# Patient Record
Sex: FEMALE | Race: WHITE | Employment: PART TIME | URBAN - METROPOLITAN AREA
[De-identification: names, ages, dates, MRNs, and addresses within clinical notes are randomized per-mention and may not be internally consistent; named-entity substitution may affect disease eponyms.]

---

## 2017-03-02 ENCOUNTER — LAB ENCOUNTER (OUTPATIENT)
Dept: LAB | Facility: HOSPITAL | Age: 57
End: 2017-03-02
Attending: INTERNAL MEDICINE
Payer: MEDICAID

## 2017-03-02 DIAGNOSIS — M06.09 RHEUMATOID ARTHRITIS OF MULTIPLE SITES WITH NEGATIVE RHEUMATOID FACTOR (HCC): ICD-10-CM

## 2017-03-02 DIAGNOSIS — M85.80 OSTEOPENIA: ICD-10-CM

## 2017-03-02 DIAGNOSIS — IMO0001 VERTEBRAL COMPRESSION FRACTURE, INITIAL ENCOUNTER: ICD-10-CM

## 2017-03-02 LAB
ALBUMIN SERPL BCP-MCNC: 4.1 G/DL (ref 3.5–4.8)
ALP SERPL-CCNC: 50 U/L (ref 32–100)
ALT SERPL-CCNC: 15 U/L (ref 14–54)
AST SERPL-CCNC: 28 U/L (ref 15–41)
BASOPHILS # BLD: 0.1 K/UL (ref 0–0.2)
BASOPHILS NFR BLD: 1 %
BILIRUB DIRECT SERPL-MCNC: 0.1 MG/DL (ref 0–0.2)
BILIRUB SERPL-MCNC: 0.6 MG/DL (ref 0.3–1.2)
BUN SERPL-MCNC: 15 MG/DL (ref 8–20)
CREAT SERPL-MCNC: 0.7 MG/DL (ref 0.5–1.5)
CRP SERPL-MCNC: 3.1 MG/DL (ref 0–0.9)
EOSINOPHIL # BLD: 0 K/UL (ref 0–0.7)
EOSINOPHIL NFR BLD: 0 %
ERYTHROCYTE [DISTWIDTH] IN BLOOD BY AUTOMATED COUNT: 14.7 % (ref 11–15)
ERYTHROCYTE [SEDIMENTATION RATE] IN BLOOD: 39 MM/HR (ref 0–30)
HCT VFR BLD AUTO: 38.7 % (ref 35–48)
HGB BLD-MCNC: 12.8 G/DL (ref 12–16)
LYMPHOCYTES # BLD: 0.9 K/UL (ref 1–4)
LYMPHOCYTES NFR BLD: 8 %
MCH RBC QN AUTO: 31.4 PG (ref 27–32)
MCHC RBC AUTO-ENTMCNC: 33 G/DL (ref 32–37)
MCV RBC AUTO: 95 FL (ref 80–100)
MONOCYTES # BLD: 0.5 K/UL (ref 0–1)
MONOCYTES NFR BLD: 4 %
NEUTROPHILS # BLD AUTO: 10.4 K/UL (ref 1.8–7.7)
NEUTROPHILS NFR BLD: 87 %
PLATELET # BLD AUTO: 382 K/UL (ref 140–400)
PMV BLD AUTO: 9.1 FL (ref 7.4–10.3)
PROT SERPL-MCNC: 7.7 G/DL (ref 5.9–8.4)
RBC # BLD AUTO: 4.08 M/UL (ref 3.7–5.4)
WBC # BLD AUTO: 11.9 K/UL (ref 4–11)

## 2017-03-02 PROCEDURE — 84520 ASSAY OF UREA NITROGEN: CPT

## 2017-03-02 PROCEDURE — 86140 C-REACTIVE PROTEIN: CPT

## 2017-03-02 PROCEDURE — 80076 HEPATIC FUNCTION PANEL: CPT

## 2017-03-02 PROCEDURE — 85652 RBC SED RATE AUTOMATED: CPT

## 2017-03-02 PROCEDURE — 82565 ASSAY OF CREATININE: CPT

## 2017-03-02 PROCEDURE — 85025 COMPLETE CBC W/AUTO DIFF WBC: CPT

## 2017-03-02 PROCEDURE — 36415 COLL VENOUS BLD VENIPUNCTURE: CPT

## 2017-05-19 PROCEDURE — 82330 ASSAY OF CALCIUM: CPT | Performed by: INTERNAL MEDICINE

## 2017-05-19 PROCEDURE — 86200 CCP ANTIBODY: CPT | Performed by: INTERNAL MEDICINE

## 2017-05-19 PROCEDURE — 87186 SC STD MICRODIL/AGAR DIL: CPT | Performed by: INTERNAL MEDICINE

## 2017-05-19 PROCEDURE — 86225 DNA ANTIBODY NATIVE: CPT | Performed by: INTERNAL MEDICINE

## 2017-05-19 PROCEDURE — 81001 URINALYSIS AUTO W/SCOPE: CPT | Performed by: INTERNAL MEDICINE

## 2017-05-19 PROCEDURE — 87077 CULTURE AEROBIC IDENTIFY: CPT | Performed by: INTERNAL MEDICINE

## 2017-05-19 PROCEDURE — 86160 COMPLEMENT ANTIGEN: CPT | Performed by: INTERNAL MEDICINE

## 2017-05-19 PROCEDURE — 87086 URINE CULTURE/COLONY COUNT: CPT | Performed by: INTERNAL MEDICINE

## 2020-01-15 ENCOUNTER — HOSPITAL (OUTPATIENT)
Dept: OTHER | Age: 60
End: 2020-01-15
Attending: FAMILY MEDICINE

## 2020-02-11 ENCOUNTER — HOSPITAL ENCOUNTER (OUTPATIENT)
Dept: PHYSICAL MEDICINE AND REHAB | Age: 60
Discharge: STILL ADMITTED OR ADMINISTRATIVE | End: 2020-02-11
Attending: FAMILY MEDICINE

## 2020-02-11 DIAGNOSIS — M25.562 ARTHRALGIA OF LEFT LOWER LEG: ICD-10-CM

## 2020-02-11 PROCEDURE — 97113 AQUATIC THERAPY/EXERCISES: CPT | Performed by: PHYSICAL THERAPY ASSISTANT

## 2020-02-12 ASSESSMENT — ENCOUNTER SYMPTOMS: PAIN SEVERITY NOW: 6

## 2020-02-13 ENCOUNTER — HOSPITAL ENCOUNTER (OUTPATIENT)
Dept: PHYSICAL MEDICINE AND REHAB | Age: 60
Discharge: STILL ADMITTED OR ADMINISTRATIVE | End: 2020-02-13
Attending: FAMILY MEDICINE

## 2020-02-13 ENCOUNTER — APPOINTMENT (OUTPATIENT)
Dept: PHYSICAL MEDICINE AND REHAB | Age: 60
End: 2020-02-13

## 2020-02-13 DIAGNOSIS — M54.89 OTHER DORSALGIA: ICD-10-CM

## 2020-02-13 DIAGNOSIS — M06.9 RHEUMATOID ARTHRITIS, ADULT (CMD): Primary | ICD-10-CM

## 2020-02-13 PROCEDURE — 97113 AQUATIC THERAPY/EXERCISES: CPT | Performed by: PHYSICAL THERAPY ASSISTANT

## 2020-02-18 ENCOUNTER — APPOINTMENT (OUTPATIENT)
Dept: PHYSICAL MEDICINE AND REHAB | Age: 60
End: 2020-02-18
Attending: FAMILY MEDICINE

## 2020-02-18 ENCOUNTER — HOSPITAL ENCOUNTER (OUTPATIENT)
Dept: PHYSICAL MEDICINE AND REHAB | Age: 60
Discharge: STILL ADMITTED OR ADMINISTRATIVE | End: 2020-02-19
Attending: FAMILY MEDICINE

## 2020-02-18 DIAGNOSIS — M25.552 PAIN IN LEFT HIP: Primary | ICD-10-CM

## 2020-02-18 PROCEDURE — 97113 AQUATIC THERAPY/EXERCISES: CPT | Performed by: PHYSICAL THERAPY ASSISTANT

## 2020-02-19 ASSESSMENT — ENCOUNTER SYMPTOMS: PAIN SEVERITY NOW: 0

## 2020-02-20 ENCOUNTER — HOSPITAL ENCOUNTER (OUTPATIENT)
Dept: PHYSICAL MEDICINE AND REHAB | Age: 60
Discharge: STILL ADMITTED OR ADMINISTRATIVE | End: 2020-02-20
Attending: FAMILY MEDICINE

## 2020-02-20 ENCOUNTER — APPOINTMENT (OUTPATIENT)
Dept: PHYSICAL MEDICINE AND REHAB | Age: 60
End: 2020-02-20
Attending: FAMILY MEDICINE

## 2020-02-20 DIAGNOSIS — M25.552 LEFT HIP PAIN: Primary | ICD-10-CM

## 2020-02-20 PROCEDURE — 97113 AQUATIC THERAPY/EXERCISES: CPT | Performed by: PHYSICAL THERAPIST

## 2022-04-20 RX ORDER — LEVOTHYROXINE SODIUM 0.1 MG/1
100 TABLET ORAL
COMMUNITY
Start: 2017-08-16

## 2022-04-21 ENCOUNTER — HOSPITAL ENCOUNTER (OUTPATIENT)
Age: 62
Discharge: HOME | End: 2022-04-21
Attending: DENTIST | Admitting: DENTIST

## 2022-04-21 ENCOUNTER — ANESTHESIA (OUTPATIENT)
Dept: SURGERY | Age: 62
End: 2022-04-21

## 2022-04-21 ENCOUNTER — ANESTHESIA EVENT (OUTPATIENT)
Dept: SURGERY | Age: 62
End: 2022-04-21

## 2022-04-21 VITALS
HEART RATE: 92 BPM | HEIGHT: 60 IN | DIASTOLIC BLOOD PRESSURE: 88 MMHG | OXYGEN SATURATION: 93 % | RESPIRATION RATE: 18 BRPM | TEMPERATURE: 96.8 F | WEIGHT: 115 LBS | SYSTOLIC BLOOD PRESSURE: 131 MMHG | BODY MASS INDEX: 22.58 KG/M2

## 2022-04-21 LAB
ANION GAP SERPL CALC-SCNC: 8 MMOL/L (ref 10–20)
ATRIAL RATE (BPM): 87
BASOPHILS # BLD: 0.1 K/MCL (ref 0–0.3)
BASOPHILS NFR BLD: 1 %
BUN SERPL-MCNC: 14 MG/DL (ref 6–20)
BUN/CREAT SERPL: 22 (ref 7–25)
CALCIUM SERPL-MCNC: 9.8 MG/DL (ref 8.4–10.2)
CHLORIDE SERPL-SCNC: 105 MMOL/L (ref 98–107)
CO2 SERPL-SCNC: 29 MMOL/L (ref 21–32)
CREAT SERPL-MCNC: 0.63 MG/DL (ref 0.51–0.95)
DEPRECATED RDW RBC: 59.4 FL (ref 39–50)
EOSINOPHIL # BLD: 0.1 K/MCL (ref 0–0.5)
EOSINOPHIL NFR BLD: 2 %
ERYTHROCYTE [DISTWIDTH] IN BLOOD: 16.9 % (ref 11–15)
FASTING DURATION TIME PATIENT: ABNORMAL H
GFR SERPLBLD BASED ON 1.73 SQ M-ARVRAT: >90 ML/MIN
GLUCOSE SERPL-MCNC: 81 MG/DL (ref 70–99)
HCT VFR BLD CALC: 31.2 % (ref 36–46.5)
HGB BLD-MCNC: 10 G/DL (ref 12–15.5)
IMM GRANULOCYTES # BLD AUTO: 0.1 K/MCL (ref 0–0.2)
IMM GRANULOCYTES # BLD: 1 %
LYMPHOCYTES # BLD: 1.5 K/MCL (ref 1–4)
LYMPHOCYTES NFR BLD: 18 %
MCH RBC QN AUTO: 31.2 PG (ref 26–34)
MCHC RBC AUTO-ENTMCNC: 32.1 G/DL (ref 32–36.5)
MCV RBC AUTO: 97.2 FL (ref 78–100)
MONOCYTES # BLD: 0.8 K/MCL (ref 0.3–0.9)
MONOCYTES NFR BLD: 10 %
NEUTROPHILS # BLD: 5.7 K/MCL (ref 1.8–7.7)
NEUTROPHILS NFR BLD: 68 %
NRBC BLD MANUAL-RTO: 0 /100 WBC
P AXIS (DEGREES): 46
PLATELET # BLD AUTO: 381 K/MCL (ref 140–450)
POTASSIUM SERPL-SCNC: 3.8 MMOL/L (ref 3.4–5.1)
PR-INTERVAL (MSEC): 138
QRS-INTERVAL (MSEC): 72
QT-INTERVAL (MSEC): 346
QTC: 416
R AXIS (DEGREES): -13
RBC # BLD: 3.21 MIL/MCL (ref 4–5.2)
REPORT TEXT: NORMAL
SARS-COV-2 RNA RESP QL NAA+PROBE: NOT DETECTED
SERVICE CMNT-IMP: NORMAL
SERVICE CMNT-IMP: NORMAL
SODIUM SERPL-SCNC: 138 MMOL/L (ref 135–145)
T AXIS (DEGREES): 44
VENTRICULAR RATE EKG/MIN (BPM): 87
WBC # BLD: 8.3 K/MCL (ref 4.2–11)

## 2022-04-21 PROCEDURE — 10004452 HB PACU ADDL 30 MINUTES: Performed by: DENTIST

## 2022-04-21 PROCEDURE — C1713 ANCHOR/SCREW BN/BN,TIS/BN: HCPCS | Performed by: DENTIST

## 2022-04-21 PROCEDURE — 13000001 HB PHASE II RECOVERY EA 30 MINUTES: Performed by: DENTIST

## 2022-04-21 PROCEDURE — 10002801 HB RX 250 W/O HCPCS

## 2022-04-21 PROCEDURE — 13000037 HB COMPLEX CASE EACH ADD MINUTE: Performed by: DENTIST

## 2022-04-21 PROCEDURE — 93005 ELECTROCARDIOGRAM TRACING: CPT | Performed by: DENTIST

## 2022-04-21 PROCEDURE — 13000036 HB COMPLEX  CASE S/U + 1ST 15 MIN: Performed by: DENTIST

## 2022-04-21 PROCEDURE — 80048 BASIC METABOLIC PNL TOTAL CA: CPT | Performed by: DENTIST

## 2022-04-21 PROCEDURE — 93010 ELECTROCARDIOGRAM REPORT: CPT | Performed by: INTERNAL MEDICINE

## 2022-04-21 PROCEDURE — 13000003 HB ANESTHESIA  GENERAL EA ADD MINUTE: Performed by: DENTIST

## 2022-04-21 PROCEDURE — 85025 COMPLETE CBC W/AUTO DIFF WBC: CPT | Performed by: DENTIST

## 2022-04-21 PROCEDURE — 10002800 HB RX 250 W HCPCS

## 2022-04-21 PROCEDURE — 10006027 HB SUPPLY 278: Performed by: DENTIST

## 2022-04-21 PROCEDURE — U0005 INFEC AGEN DETEC AMPLI PROBE: HCPCS | Performed by: DENTIST

## 2022-04-21 PROCEDURE — 10002807 HB RX 258

## 2022-04-21 PROCEDURE — 10002803 HB RX 637

## 2022-04-21 PROCEDURE — 10004451 HB PACU RECOVERY 1ST 30 MINUTES: Performed by: DENTIST

## 2022-04-21 PROCEDURE — 10006023 HB SUPPLY 272: Performed by: DENTIST

## 2022-04-21 PROCEDURE — 13000002 HB ANESTHESIA  GENERAL  S/U + 1ST 15 MIN: Performed by: DENTIST

## 2022-04-21 PROCEDURE — 10002801 HB RX 250 W/O HCPCS: Performed by: DENTIST

## 2022-04-21 DEVICE — IMPLANTABLE DEVICE: Type: IMPLANTABLE DEVICE | Site: FACE | Status: FUNCTIONAL

## 2022-04-21 DEVICE — SCREW BN 2MM 6MM SMARTLOCK SELF DRILL LOCK TI NS MXLFCL LF: Type: IMPLANTABLE DEVICE | Site: FACE | Status: FUNCTIONAL

## 2022-04-21 DEVICE — SCREW BN 2MM 8MM SMARTLOCK SELF DRILL LOCK TI NS MXLFCL LF: Type: IMPLANTABLE DEVICE | Site: FACE | Status: FUNCTIONAL

## 2022-04-21 DEVICE — WIRE DNTL 160MM 24GA LGT BLUNT: Type: IMPLANTABLE DEVICE | Site: MOUTH | Status: FUNCTIONAL

## 2022-04-21 DEVICE — PLATE BN MXLMNDB NS SMARTLOCK HYBRID MMF: Type: IMPLANTABLE DEVICE | Site: FACE | Status: FUNCTIONAL

## 2022-04-21 DEVICE — SCREW BN 2MM 8MM SELF DRILL XPN LGT WIRE TI NS SLVR MXLMNDB: Type: IMPLANTABLE DEVICE | Site: FACE | Status: FUNCTIONAL

## 2022-04-21 DEVICE — SCREW BN 2MM 12MM SELF DRILL XPN LGT WIRE TI NS SLVR MXLMNDB: Type: IMPLANTABLE DEVICE | Site: FACE | Status: FUNCTIONAL

## 2022-04-21 RX ORDER — ONDANSETRON 2 MG/ML
INJECTION INTRAMUSCULAR; INTRAVENOUS PRN
Status: DISCONTINUED | OUTPATIENT
Start: 2022-04-21 | End: 2022-04-21

## 2022-04-21 RX ORDER — ROCURONIUM BROMIDE 10 MG/ML
INJECTION, SOLUTION INTRAVENOUS PRN
Status: DISCONTINUED | OUTPATIENT
Start: 2022-04-21 | End: 2022-04-21

## 2022-04-21 RX ORDER — DICLOFENAC SODIUM AND MISOPROSTOL 75; 200 MG/1; UG/1
TABLET, DELAYED RELEASE ORAL
COMMUNITY

## 2022-04-21 RX ORDER — PREDNISONE 5 MG/1
5 TABLET ORAL DAILY
COMMUNITY

## 2022-04-21 RX ORDER — LIDOCAINE HYDROCHLORIDE 20 MG/ML
INJECTION, SOLUTION INFILTRATION; PERINEURAL PRN
Status: DISCONTINUED | OUTPATIENT
Start: 2022-04-21 | End: 2022-04-21

## 2022-04-21 RX ORDER — HYDRALAZINE HYDROCHLORIDE 20 MG/ML
5 INJECTION INTRAMUSCULAR; INTRAVENOUS EVERY 10 MIN PRN
Status: DISCONTINUED | OUTPATIENT
Start: 2022-04-21 | End: 2022-04-22 | Stop reason: HOSPADM

## 2022-04-21 RX ORDER — IBUPROFEN 400 MG/1
400 TABLET ORAL EVERY 6 HOURS PRN
COMMUNITY

## 2022-04-21 RX ORDER — GLYCOPYRROLATE 0.2 MG/ML
INJECTION, SOLUTION INTRAMUSCULAR; INTRAVENOUS PRN
Status: DISCONTINUED | OUTPATIENT
Start: 2022-04-21 | End: 2022-04-21

## 2022-04-21 RX ORDER — METOCLOPRAMIDE HYDROCHLORIDE 5 MG/ML
5 INJECTION INTRAMUSCULAR; INTRAVENOUS EVERY 6 HOURS PRN
Status: DISCONTINUED | OUTPATIENT
Start: 2022-04-21 | End: 2022-04-22 | Stop reason: HOSPADM

## 2022-04-21 RX ORDER — ONDANSETRON 2 MG/ML
4 INJECTION INTRAMUSCULAR; INTRAVENOUS 2 TIMES DAILY PRN
Status: DISCONTINUED | OUTPATIENT
Start: 2022-04-21 | End: 2022-04-22 | Stop reason: HOSPADM

## 2022-04-21 RX ORDER — MIDAZOLAM HYDROCHLORIDE 1 MG/ML
INJECTION, SOLUTION INTRAMUSCULAR; INTRAVENOUS PRN
Status: DISCONTINUED | OUTPATIENT
Start: 2022-04-21 | End: 2022-04-21

## 2022-04-21 RX ORDER — DEXAMETHASONE SODIUM PHOSPHATE 4 MG/ML
INJECTION, SOLUTION INTRA-ARTICULAR; INTRALESIONAL; INTRAMUSCULAR; INTRAVENOUS; SOFT TISSUE PRN
Status: DISCONTINUED | OUTPATIENT
Start: 2022-04-21 | End: 2022-04-21

## 2022-04-21 RX ORDER — SODIUM CHLORIDE, SODIUM LACTATE, POTASSIUM CHLORIDE, CALCIUM CHLORIDE 600; 310; 30; 20 MG/100ML; MG/100ML; MG/100ML; MG/100ML
INJECTION, SOLUTION INTRAVENOUS CONTINUOUS PRN
Status: DISCONTINUED | OUTPATIENT
Start: 2022-04-21 | End: 2022-04-21

## 2022-04-21 RX ORDER — CLINDAMYCIN PHOSPHATE 900 MG/50ML
900 INJECTION INTRAVENOUS
Status: DISCONTINUED | OUTPATIENT
Start: 2022-04-21 | End: 2022-04-21 | Stop reason: HOSPADM

## 2022-04-21 RX ORDER — SODIUM CHLORIDE 9 MG/ML
INJECTION, SOLUTION INTRAVENOUS CONTINUOUS
Status: DISCONTINUED | OUTPATIENT
Start: 2022-04-21 | End: 2022-04-21 | Stop reason: HOSPADM

## 2022-04-21 RX ORDER — PROPOFOL 10 MG/ML
INJECTION, EMULSION INTRAVENOUS PRN
Status: DISCONTINUED | OUTPATIENT
Start: 2022-04-21 | End: 2022-04-21

## 2022-04-21 RX ORDER — HYDROCODONE BITARTRATE AND ACETAMINOPHEN 5; 325 MG/1; MG/1
1 TABLET ORAL EVERY 6 HOURS PRN
COMMUNITY

## 2022-04-21 RX ADMIN — LIDOCAINE HYDROCHLORIDE 3 ML: 20 INJECTION, SOLUTION INFILTRATION; PERINEURAL at 17:51

## 2022-04-21 RX ADMIN — PHENYLEPHRINE HYDROCHLORIDE 2 SPRAY: 1 SPRAY NASAL at 17:49

## 2022-04-21 RX ADMIN — ROCURONIUM BROMIDE 30 MG: 10 INJECTION INTRAVENOUS at 18:04

## 2022-04-21 RX ADMIN — FENTANYL CITRATE 25 MCG: 50 INJECTION, SOLUTION INTRAMUSCULAR; INTRAVENOUS at 18:37

## 2022-04-21 RX ADMIN — DEXAMETHASONE SODIUM PHOSPHATE 10 MG: 4 INJECTION, SOLUTION INTRAMUSCULAR; INTRAVENOUS at 18:01

## 2022-04-21 RX ADMIN — SUGAMMADEX 200 MG: 100 INJECTION, SOLUTION INTRAVENOUS at 19:58

## 2022-04-21 RX ADMIN — FENTANYL CITRATE 50 MCG: 50 INJECTION INTRAMUSCULAR; INTRAVENOUS at 20:30

## 2022-04-21 RX ADMIN — GLYCOPYRROLATE 0.2 MG: 0.2 INJECTION, SOLUTION INTRAMUSCULAR; INTRAVENOUS at 17:45

## 2022-04-21 RX ADMIN — ONDANSETRON 4 MG: 2 INJECTION INTRAMUSCULAR; INTRAVENOUS at 20:41

## 2022-04-21 RX ADMIN — HYDROMORPHONE HYDROCHLORIDE 0.2 MG: 1 INJECTION, SOLUTION INTRAMUSCULAR; INTRAVENOUS; SUBCUTANEOUS at 20:41

## 2022-04-21 RX ADMIN — FENTANYL CITRATE 50 MCG: 50 INJECTION INTRAMUSCULAR; INTRAVENOUS at 20:15

## 2022-04-21 RX ADMIN — FENTANYL CITRATE 50 MCG: 50 INJECTION, SOLUTION INTRAMUSCULAR; INTRAVENOUS at 17:51

## 2022-04-21 RX ADMIN — SODIUM CHLORIDE: 9 INJECTION, SOLUTION INTRAVENOUS at 17:41

## 2022-04-21 RX ADMIN — Medication 100 MG: at 17:51

## 2022-04-21 RX ADMIN — SODIUM CHLORIDE, POTASSIUM CHLORIDE, SODIUM LACTATE AND CALCIUM CHLORIDE: 600; 310; 30; 20 INJECTION, SOLUTION INTRAVENOUS at 18:05

## 2022-04-21 RX ADMIN — ONDANSETRON 4 MG: 2 INJECTION INTRAMUSCULAR; INTRAVENOUS at 19:55

## 2022-04-21 RX ADMIN — PROPOFOL 100 MG: 10 INJECTION, EMULSION INTRAVENOUS at 17:51

## 2022-04-21 RX ADMIN — HYDROCORTISONE SODIUM SUCCINATE 100 MG: 100 INJECTION, POWDER, FOR SOLUTION INTRAMUSCULAR; INTRAVENOUS at 19:10

## 2022-04-21 RX ADMIN — CEFAZOLIN SODIUM 2000 MG: 300 INJECTION, POWDER, LYOPHILIZED, FOR SOLUTION INTRAVENOUS at 18:00

## 2022-04-21 RX ADMIN — PROPOFOL 30 MG: 10 INJECTION, EMULSION INTRAVENOUS at 19:50

## 2022-04-21 RX ADMIN — HYDROMORPHONE HYDROCHLORIDE 0.4 MG: 1 INJECTION, SOLUTION INTRAMUSCULAR; INTRAVENOUS; SUBCUTANEOUS at 20:15

## 2022-04-21 RX ADMIN — METOCLOPRAMIDE 5 MG: 5 INJECTION, SOLUTION INTRAMUSCULAR; INTRAVENOUS at 20:42

## 2022-04-21 RX ADMIN — PROPOFOL 40 MG: 10 INJECTION, EMULSION INTRAVENOUS at 18:13

## 2022-04-21 RX ADMIN — HYDROMORPHONE HYDROCHLORIDE 0.4 MG: 1 INJECTION, SOLUTION INTRAMUSCULAR; INTRAVENOUS; SUBCUTANEOUS at 20:30

## 2022-04-21 RX ADMIN — MIDAZOLAM HYDROCHLORIDE 2 MG: 1 INJECTION, SOLUTION INTRAMUSCULAR; INTRAVENOUS at 17:45

## 2022-04-21 ASSESSMENT — PAIN SCALES - GENERAL
PAINLEVEL_OUTOF10: 0
PAINLEVEL_OUTOF10: 4
PAINLEVEL_OUTOF10: 6
PAINLEVEL_OUTOF10: 4

## 2024-11-18 ENCOUNTER — TELEPHONE (OUTPATIENT)
Dept: SURGERY | Facility: CLINIC | Age: 64
End: 2024-11-18

## 2024-11-18 RX ORDER — ASPIRIN 81 MG/1
81 TABLET ORAL DAILY
COMMUNITY

## 2024-11-18 RX ORDER — HYDROCODONE BITARTRATE AND ACETAMINOPHEN 5; 325 MG/1; MG/1
1 TABLET ORAL EVERY 6 HOURS PRN
COMMUNITY

## 2024-11-18 RX ORDER — FOLIC ACID 1 MG/1
1.25 TABLET ORAL
COMMUNITY

## 2024-11-18 RX ORDER — LEFLUNOMIDE 20 MG/1
20 TABLET ORAL DAILY
COMMUNITY

## 2024-11-18 RX ORDER — DICLOFENAC SODIUM AND MISOPROSTOL 75; 200 MG/1; UG/1
1 TABLET, DELAYED RELEASE ORAL DAILY
COMMUNITY

## 2024-11-18 RX ORDER — CHLORHEXIDINE GLUCONATE ORAL RINSE 1.2 MG/ML
15 SOLUTION DENTAL 2 TIMES DAILY
COMMUNITY

## 2024-11-18 NOTE — TELEPHONE ENCOUNTER
Left voice message for Dr Bell's office to please fax us mutual pt's medical history and progress notes to [phone] and please call us at [phone] to let us know they have been sent.  Pt has appointment with Dr Ruiz 11/21/24.

## 2024-11-19 NOTE — TELEPHONE ENCOUNTER
Progress note received via fax from Dr Bell's office and medical history updated.   Appt 11/21 at 9:00 AM

## 2024-11-21 ENCOUNTER — OFFICE VISIT (OUTPATIENT)
Dept: SURGERY | Facility: CLINIC | Age: 64
End: 2024-11-21
Payer: COMMERCIAL

## 2024-11-21 DIAGNOSIS — T81.31XA DISRUPTION OF EXTERNAL SURGICAL WOUND, INITIAL ENCOUNTER: Primary | ICD-10-CM

## 2024-11-21 PROCEDURE — 99205 OFFICE O/P NEW HI 60 MIN: CPT | Performed by: PLASTIC SURGERY

## 2024-11-21 RX ORDER — MULTIVIT-MIN/IRON/FOLIC ACID/K 18-600-40
1 CAPSULE ORAL DAILY
COMMUNITY

## 2024-11-21 RX ORDER — FLUTICASONE PROPIONATE 50 MCG
SPRAY, SUSPENSION (ML) NASAL
COMMUNITY
Start: 2024-05-02

## 2024-11-21 RX ORDER — HYDROCODONE BITARTRATE AND ACETAMINOPHEN 7.5; 325 MG/1; MG/1
TABLET ORAL
Qty: 10 TABLET | Refills: 0 | Status: SHIPPED | OUTPATIENT
Start: 2024-11-21 | End: 2024-11-25

## 2024-11-21 RX ORDER — GABAPENTIN 600 MG/1
600 TABLET ORAL 2 TIMES DAILY
COMMUNITY
Start: 2024-08-31

## 2024-11-21 NOTE — PROGRESS NOTES
Patient request for surgery signed by patient and witnessed and signed by RN.  Prescription for norco 7.5mg electronically sent to pharmacy per Dr. Ruiz's order and patient instructed to  prescription before surgery.   Pre-Surgical Instruction Handout given to and reviewed w/patient.  All questions and concerns answered; pt verbalized an understanding of all pre-operative teaching.  Patient instructed to call the office with any further questions and/or concerns.  Patient escorted to surgery scheduling to schedule surgery and post-operative appointments.

## 2024-11-21 NOTE — H&P
Praveena Olivia is a 64 year old female that presents with   Chief Complaint   Patient presents with    Wound Care     Right knee eschar post total knee replacement   .    REFERRED BY:  Aaron Bell      Pacemaker: No  Latex Allergy: no  Coumadin: No  Plavix: No  Other anticoagulants: No  Diet medication: No  Cardiac stents: No    HAND DOMINANCE:  Right    Profession: Disabled    RECONSTRUCTIVE HISTORY    SUN EXPOSURE   Current no   Past yes   Sunburns yes   Tanning salons current no   Tanning salons past no     SKIN CANCER    Personal history of skin cancer: none      HPI:       Surgery 1: RTKA  - Date: 08/20/24  - Days Since: 93    64-year-old female with a nonhealing wound of the right knee    Total knee replacement.  Has developed an eschar which is adherent for 3 months    Moderate pain    No drainage       Review of Systems:   Constitutional: No change in appetite, chill/rigors, or fatigue  GI: No jaundice  Endocrine: No generalized weakness  Neurological: No aphasia, loss of consciousness, or seizures      Integumentary:     WOUND     Duration 8/20/24    Location Right knee incision    Mechanism S/p right total knee replacement    Pain  Yes 8/10    Treatment Hospitalized 9/14/24 IV antibiotics     Local care none      PMH:     MEDICAL  Past Medical History:    Bladder fistula    Cardiac murmur    Cataracts, bilateral    Chronic liver disease    Depressive disorder    Diverticulitis    Essential hypertension    History of compression fracture of vertebral column    Hypothyroid    Osteoarthritis    Osteopenia    Panhypopituitarism (HCC)    RA (rheumatoid arthritis) (HCC)    Retinal detachment, left    Rib fracture        SURGICAL  Past Surgical History:   Procedure Laterality Date    Back surgery      Bunionectomy      Hernia repair      Other surgical history      recto-vaginal repair     Other surgical history      L     Other surgical history      R 3rd toe screw     Other surgical history      Cataract  bilateral    Rotator cuff repair      Total knee replacement Right 08/20/2024        ALLERIGIES  Allergies[1]     MEDICATIONS  Current Outpatient Medications   Medication Sig Dispense Refill    gabapentin 600 MG Oral Tab Take 1 tablet (600 mg total) by mouth 2 (two) times daily.      fluticasone propionate 50 MCG/ACT Nasal Suspension USE 2 SPRAY IN EACH NOSTRIL ONCE DAILY SHAKE WELL BEFORE USE, PRIME PUMP AND CLEAN TIP WHEN DONE      aspirin 81 MG Oral Tab EC Take 1 tablet (81 mg total) by mouth daily.      chlorhexidine gluconate 0.12 % Mouth/Throat Solution Use as directed 15 mL in the mouth or throat 2 (two) times daily.      Diclofenac-miSOPROStol 75-0.2 MG Oral Tab EC Take 1 tablet by mouth daily.      Cholecalciferol (VITAMIN D3) 1.25 MG (55084 UT) Oral Cap Take 1.25 mg by mouth every 7 days.      HYDROcodone-acetaminophen 5-325 MG Oral Tab Take 1 tablet by mouth every 6 (six) hours as needed for Pain.      leflunomide 20 MG Oral Tab Take 1 tablet (20 mg total) by mouth daily.      levothyroxine 88 MCG Oral Tab       denosumab (PROLIA) 60 MG/ML Subcutaneous Solution Inject 1 mL (60 mg total) into the skin every 6 (six) months. 1 mL 0    predniSONE 5 MG Oral Tab Take 1 tablet (5 mg total) by mouth daily.      Levothyroxine Sodium 100 MCG Oral Tab Take 1 tablet (100 mcg total) by mouth before breakfast.      Cholecalciferol (VITAMIN D) 50 MCG (2000 UT) Oral Cap Take 1 tablet by mouth daily.      Meloxicam 15 MG Oral Tab  (Patient not taking: Reported on 11/21/2024)          SOCIAL HISTORY  Social History     Socioeconomic History    Marital status:    Tobacco Use    Smoking status: Never   Substance and Sexual Activity    Alcohol use: Yes     Alcohol/week: 0.0 standard drinks of alcohol     Comment: occasional    Drug use: No   Other Topics Concern    Right Handed Yes     Social Drivers of Health     Financial Resource Strain: Low Risk  (5/31/2024)    Received from [facility]     Overall Financial Resource Strain (CARDIA)     Difficulty of Paying Living Expenses: Not very hard   Food Insecurity: Low Risk  (9/16/2024)    Received from [facility]     Within the past 12 months, the food you bought just didn't last and you didn't have money to get more.: 3     Within the past 12 months, you worried that your food would run out before you got money to buy more.: 3   Transportation Needs: Not At Risk (9/16/2024)    Received from [facility] Transportation Needs     In the past 12 months, has lack of reliable transportation kept you from medical appointments, meetings, work or from getting things needed for daily living?: No   Social Connections: Feeling Socially Integrated (8/8/2024)    Received from [facility]    OASIS : Social Isolation     Frequency of experiencing loneliness or isolation: Never   Housing Stability: Not At Risk (9/16/2024)    Received from [facility] Housing     What is your living situation today?: I have a steady place to live     Think about the place you live. Do you have problems with any of the following?: None of the above        FAMILY HISTORY  Family History   Problem Relation Age of Onset    Diabetes Father     Heart Disorder Father           PHYSICAL EXAM:     CONSTITUTIONAL: Overall appearance - Normal  HEENT: Normocephalic  EYES: Conjunctiva - Right: Normal, Left: Normal; EOMI  EARS: Inspection - Right: Normal, Left: Normal  NECK/THYROID: Inspection - Normal, Palpation - Normal, Thyroid gland - Normal, No adenopathy  RESPIRATORY: Inspection - Normal, Effort - Normal  CARDIOVASCULAR: Regular rhythm, No murmurs  ABDOMEN: Inspection - Normal, No abdominal tenderness  NEURO: Memory intact  PSYCH: Oriented to person, place, time, and situation, Appropriate mood and affect      Physical Exam:       Right knee wound dehiscence with 14 x 2.5 dry eschar  No drainage  No infection        ASSESSMENT/PLAN:           This is a  complex right knee wound.  An orthopedic prosthesis is present, and there is minimal lax skin.  This needs debridement of all involved tissues, including bursa if present, and flap reconstruction.    I explained the nature of the wound, wound management, and the fact that  multiple procedures may be necessary.  This may impact bodily function.    Wound management and healing are difficult in these situations, as there has been previous surgery in the area, and tissues may not heal normally.  The wound may not heal, even after surgery, and there is a risk of losing the prosthesis.        I answered the patient's questions, and the patient wishes to proceed with surgery.      11/21/2024  Moris Ruiz MD          +++++++++++++++++++++++++++++++++++++++++++++++++    MEDICAL DECISION MAKING    PROBLEMS      HIGH    (number / complexity)          Acute complicated injury with threat to bodily function    DATA         STRAIGHTFORWARD    (amount / complexity)           MANAGEMENT RISK  HIGH    (complications/ morbidity)       Major surgery with risk factors                  MDM LEVEL    HIGH            [1]   Allergies  Allergen Reactions    Penicillins RASH    Sulfa Antibiotics      Liver Problems    Adalimumab RASH     Newly developed psoriasis

## 2024-11-21 NOTE — H&P (VIEW-ONLY)
Praveena Olivia is a 64 year old female that presents with   Chief Complaint   Patient presents with    Wound Care     Right knee eschar post total knee replacement   .    REFERRED BY:  Aaron Bell      Pacemaker: No  Latex Allergy: no  Coumadin: No  Plavix: No  Other anticoagulants: No  Diet medication: No  Cardiac stents: No    HAND DOMINANCE:  Right    Profession: Disabled    RECONSTRUCTIVE HISTORY    SUN EXPOSURE   Current no   Past yes   Sunburns yes   Tanning salons current no   Tanning salons past no     SKIN CANCER    Personal history of skin cancer: none      HPI:       Surgery 1: RTKA  - Date: 08/20/24  - Days Since: 93    64-year-old female with a nonhealing wound of the right knee    Total knee replacement.  Has developed an eschar which is adherent for 3 months    Moderate pain    No drainage       Review of Systems:   Constitutional: No change in appetite, chill/rigors, or fatigue  GI: No jaundice  Endocrine: No generalized weakness  Neurological: No aphasia, loss of consciousness, or seizures      Integumentary:     WOUND     Duration 8/20/24    Location Right knee incision    Mechanism S/p right total knee replacement    Pain  Yes 8/10    Treatment Hospitalized 9/14/24 IV antibiotics     Local care none      PMH:     MEDICAL  Past Medical History:    Bladder fistula    Cardiac murmur    Cataracts, bilateral    Chronic liver disease    Depressive disorder    Diverticulitis    Essential hypertension    History of compression fracture of vertebral column    Hypothyroid    Osteoarthritis    Osteopenia    Panhypopituitarism (HCC)    RA (rheumatoid arthritis) (HCC)    Retinal detachment, left    Rib fracture        SURGICAL  Past Surgical History:   Procedure Laterality Date    Back surgery      Bunionectomy      Hernia repair      Other surgical history      recto-vaginal repair     Other surgical history      L     Other surgical history      R 3rd toe screw     Other surgical history      Cataract  bilateral    Rotator cuff repair      Total knee replacement Right 08/20/2024        ALLERIGIES  Allergies[1]     MEDICATIONS  Current Outpatient Medications   Medication Sig Dispense Refill    gabapentin 600 MG Oral Tab Take 1 tablet (600 mg total) by mouth 2 (two) times daily.      fluticasone propionate 50 MCG/ACT Nasal Suspension USE 2 SPRAY IN EACH NOSTRIL ONCE DAILY SHAKE WELL BEFORE USE, PRIME PUMP AND CLEAN TIP WHEN DONE      aspirin 81 MG Oral Tab EC Take 1 tablet (81 mg total) by mouth daily.      chlorhexidine gluconate 0.12 % Mouth/Throat Solution Use as directed 15 mL in the mouth or throat 2 (two) times daily.      Diclofenac-miSOPROStol 75-0.2 MG Oral Tab EC Take 1 tablet by mouth daily.      Cholecalciferol (VITAMIN D3) 1.25 MG (60361 UT) Oral Cap Take 1.25 mg by mouth every 7 days.      HYDROcodone-acetaminophen 5-325 MG Oral Tab Take 1 tablet by mouth every 6 (six) hours as needed for Pain.      leflunomide 20 MG Oral Tab Take 1 tablet (20 mg total) by mouth daily.      levothyroxine 88 MCG Oral Tab       denosumab (PROLIA) 60 MG/ML Subcutaneous Solution Inject 1 mL (60 mg total) into the skin every 6 (six) months. 1 mL 0    predniSONE 5 MG Oral Tab Take 1 tablet (5 mg total) by mouth daily.      Levothyroxine Sodium 100 MCG Oral Tab Take 1 tablet (100 mcg total) by mouth before breakfast.      Cholecalciferol (VITAMIN D) 50 MCG (2000 UT) Oral Cap Take 1 tablet by mouth daily.      Meloxicam 15 MG Oral Tab  (Patient not taking: Reported on 11/21/2024)          SOCIAL HISTORY  Social History     Socioeconomic History    Marital status:    Tobacco Use    Smoking status: Never   Substance and Sexual Activity    Alcohol use: Yes     Alcohol/week: 0.0 standard drinks of alcohol     Comment: occasional    Drug use: No   Other Topics Concern    Right Handed Yes     Social Drivers of Health     Financial Resource Strain: Low Risk  (5/31/2024)    Received from [facility]     Overall Financial Resource Strain (CARDIA)     Difficulty of Paying Living Expenses: Not very hard   Food Insecurity: Low Risk  (9/16/2024)    Received from [facility]     Within the past 12 months, the food you bought just didn't last and you didn't have money to get more.: 3     Within the past 12 months, you worried that your food would run out before you got money to buy more.: 3   Transportation Needs: Not At Risk (9/16/2024)    Received from [facility] Transportation Needs     In the past 12 months, has lack of reliable transportation kept you from medical appointments, meetings, work or from getting things needed for daily living?: No   Social Connections: Feeling Socially Integrated (8/8/2024)    Received from [facility]    OASIS : Social Isolation     Frequency of experiencing loneliness or isolation: Never   Housing Stability: Not At Risk (9/16/2024)    Received from [facility]     What is your living situation today?: I have a steady place to live     Think about the place you live. Do you have problems with any of the following?: None of the above        FAMILY HISTORY  Family History   Problem Relation Age of Onset    Diabetes Father     Heart Disorder Father           PHYSICAL EXAM:     CONSTITUTIONAL: Overall appearance - Normal  HEENT: Normocephalic  EYES: Conjunctiva - Right: Normal, Left: Normal; EOMI  EARS: Inspection - Right: Normal, Left: Normal  NECK/THYROID: Inspection - Normal, Palpation - Normal, Thyroid gland - Normal, No adenopathy  RESPIRATORY: Inspection - Normal, Effort - Normal  CARDIOVASCULAR: Regular rhythm, No murmurs  ABDOMEN: Inspection - Normal, No abdominal tenderness  NEURO: Memory intact  PSYCH: Oriented to person, place, time, and situation, Appropriate mood and affect      Physical Exam:       Right knee wound dehiscence with 14 x 2.5 dry eschar  No drainage  No infection        ASSESSMENT/PLAN:           This is a  complex right knee wound.  An orthopedic prosthesis is present, and there is minimal lax skin.  This needs debridement of all involved tissues, including bursa if present, and flap reconstruction.    I explained the nature of the wound, wound management, and the fact that  multiple procedures may be necessary.  This may impact bodily function.    Wound management and healing are difficult in these situations, as there has been previous surgery in the area, and tissues may not heal normally.  The wound may not heal, even after surgery, and there is a risk of losing the prosthesis.        I answered the patient's questions, and the patient wishes to proceed with surgery.      11/21/2024  Moris Ruiz MD          +++++++++++++++++++++++++++++++++++++++++++++++++    MEDICAL DECISION MAKING    PROBLEMS      HIGH    (number / complexity)          Acute complicated injury with threat to bodily function    DATA         STRAIGHTFORWARD    (amount / complexity)           MANAGEMENT RISK  HIGH    (complications/ morbidity)       Major surgery with risk factors                  MDM LEVEL    HIGH            [1]   Allergies  Allergen Reactions    Penicillins RASH    Sulfa Antibiotics      Liver Problems    Adalimumab RASH     Newly developed psoriasis

## 2024-11-22 ENCOUNTER — HOSPITAL DOCUMENTATION (OUTPATIENT)
Dept: SURGERY | Facility: CLINIC | Age: 64
End: 2024-11-22

## 2024-11-22 ENCOUNTER — ANESTHESIA EVENT (OUTPATIENT)
Dept: SURGERY | Facility: HOSPITAL | Age: 64
End: 2024-11-22
Payer: COMMERCIAL

## 2024-11-22 ENCOUNTER — HOSPITAL ENCOUNTER (OUTPATIENT)
Facility: HOSPITAL | Age: 64
Setting detail: HOSPITAL OUTPATIENT SURGERY
Discharge: HOME | End: 2024-11-22
Attending: PLASTIC SURGERY | Admitting: PLASTIC SURGERY
Payer: COMMERCIAL

## 2024-11-22 ENCOUNTER — ANESTHESIA (OUTPATIENT)
Dept: SURGERY | Facility: HOSPITAL | Age: 64
End: 2024-11-22
Payer: COMMERCIAL

## 2024-11-22 VITALS
SYSTOLIC BLOOD PRESSURE: 151 MMHG | DIASTOLIC BLOOD PRESSURE: 79 MMHG | OXYGEN SATURATION: 98 % | BODY MASS INDEX: 17.84 KG/M2 | HEIGHT: 60 IN | RESPIRATION RATE: 16 BRPM | TEMPERATURE: 98 F | HEART RATE: 64 BPM | WEIGHT: 90.88 LBS

## 2024-11-22 DIAGNOSIS — T81.31XA DISRUPTION OF EXTERNAL SURGICAL WOUND, INITIAL ENCOUNTER: Primary | ICD-10-CM

## 2024-11-22 DIAGNOSIS — T81.31XA DISRUPTION OF EXTERNAL SURGICAL WOUND, INITIAL ENCOUNTER: ICD-10-CM

## 2024-11-22 PROBLEM — Z04.9 OBSERVATION FOR SUSPECTED CONDITION: Status: ACTIVE | Noted: 2024-11-22

## 2024-11-22 PROCEDURE — 0YQF0ZZ REPAIR RIGHT KNEE REGION, OPEN APPROACH: ICD-10-PCS | Performed by: PLASTIC SURGERY

## 2024-11-22 PROCEDURE — 13160 SEC CLSR SURG WND/DEHSN XTN: CPT | Performed by: PLASTIC SURGERY

## 2024-11-22 RX ORDER — ONDANSETRON 2 MG/ML
4 INJECTION INTRAMUSCULAR; INTRAVENOUS EVERY 6 HOURS PRN
Status: DISCONTINUED | OUTPATIENT
Start: 2024-11-22 | End: 2024-11-22

## 2024-11-22 RX ORDER — KETOROLAC TROMETHAMINE 30 MG/ML
INJECTION, SOLUTION INTRAMUSCULAR; INTRAVENOUS AS NEEDED
Status: DISCONTINUED | OUTPATIENT
Start: 2024-11-22 | End: 2024-11-22 | Stop reason: SURG

## 2024-11-22 RX ORDER — MIDAZOLAM HYDROCHLORIDE 1 MG/ML
INJECTION INTRAMUSCULAR; INTRAVENOUS AS NEEDED
Status: DISCONTINUED | OUTPATIENT
Start: 2024-11-22 | End: 2024-11-22 | Stop reason: SURG

## 2024-11-22 RX ORDER — HYDROCODONE BITARTRATE AND ACETAMINOPHEN 7.5; 325 MG/1; MG/1
1 TABLET ORAL EVERY 4 HOURS PRN
Status: DISCONTINUED | OUTPATIENT
Start: 2024-11-22 | End: 2024-11-22

## 2024-11-22 RX ORDER — SODIUM CHLORIDE, SODIUM LACTATE, POTASSIUM CHLORIDE, CALCIUM CHLORIDE 600; 310; 30; 20 MG/100ML; MG/100ML; MG/100ML; MG/100ML
INJECTION, SOLUTION INTRAVENOUS CONTINUOUS
Status: DISCONTINUED | OUTPATIENT
Start: 2024-11-22 | End: 2024-11-22

## 2024-11-22 RX ORDER — METOCLOPRAMIDE HYDROCHLORIDE 5 MG/ML
10 INJECTION INTRAMUSCULAR; INTRAVENOUS EVERY 8 HOURS PRN
Status: DISCONTINUED | OUTPATIENT
Start: 2024-11-22 | End: 2024-11-22

## 2024-11-22 RX ORDER — ONDANSETRON 2 MG/ML
INJECTION INTRAMUSCULAR; INTRAVENOUS AS NEEDED
Status: DISCONTINUED | OUTPATIENT
Start: 2024-11-22 | End: 2024-11-22 | Stop reason: SURG

## 2024-11-22 RX ORDER — MORPHINE SULFATE 10 MG/ML
6 INJECTION, SOLUTION INTRAMUSCULAR; INTRAVENOUS EVERY 10 MIN PRN
Status: DISCONTINUED | OUTPATIENT
Start: 2024-11-22 | End: 2024-11-22

## 2024-11-22 RX ORDER — HYDROCODONE BITARTRATE AND ACETAMINOPHEN 7.5; 325 MG/1; MG/1
1 TABLET ORAL ONCE
Status: COMPLETED | OUTPATIENT
Start: 2024-11-22 | End: 2024-11-22

## 2024-11-22 RX ORDER — HYDROMORPHONE HYDROCHLORIDE 1 MG/ML
0.4 INJECTION, SOLUTION INTRAMUSCULAR; INTRAVENOUS; SUBCUTANEOUS EVERY 5 MIN PRN
Status: DISCONTINUED | OUTPATIENT
Start: 2024-11-22 | End: 2024-11-22

## 2024-11-22 RX ORDER — MORPHINE SULFATE 4 MG/ML
4 INJECTION, SOLUTION INTRAMUSCULAR; INTRAVENOUS EVERY 10 MIN PRN
Status: DISCONTINUED | OUTPATIENT
Start: 2024-11-22 | End: 2024-11-22

## 2024-11-22 RX ORDER — HYDROMORPHONE HYDROCHLORIDE 1 MG/ML
0.2 INJECTION, SOLUTION INTRAMUSCULAR; INTRAVENOUS; SUBCUTANEOUS EVERY 5 MIN PRN
Status: DISCONTINUED | OUTPATIENT
Start: 2024-11-22 | End: 2024-11-22

## 2024-11-22 RX ORDER — HYDROMORPHONE HYDROCHLORIDE 1 MG/ML
0.6 INJECTION, SOLUTION INTRAMUSCULAR; INTRAVENOUS; SUBCUTANEOUS EVERY 5 MIN PRN
Status: DISCONTINUED | OUTPATIENT
Start: 2024-11-22 | End: 2024-11-22

## 2024-11-22 RX ORDER — LIDOCAINE HYDROCHLORIDE 10 MG/ML
INJECTION, SOLUTION EPIDURAL; INFILTRATION; INTRACAUDAL; PERINEURAL AS NEEDED
Status: DISCONTINUED | OUTPATIENT
Start: 2024-11-22 | End: 2024-11-22 | Stop reason: SURG

## 2024-11-22 RX ORDER — NALOXONE HYDROCHLORIDE 0.4 MG/ML
0.08 INJECTION, SOLUTION INTRAMUSCULAR; INTRAVENOUS; SUBCUTANEOUS AS NEEDED
Status: DISCONTINUED | OUTPATIENT
Start: 2024-11-22 | End: 2024-11-22

## 2024-11-22 RX ORDER — MORPHINE SULFATE 4 MG/ML
2 INJECTION, SOLUTION INTRAMUSCULAR; INTRAVENOUS EVERY 10 MIN PRN
Status: DISCONTINUED | OUTPATIENT
Start: 2024-11-22 | End: 2024-11-22

## 2024-11-22 RX ORDER — ACETAMINOPHEN 500 MG
1000 TABLET ORAL ONCE
Status: COMPLETED | OUTPATIENT
Start: 2024-11-22 | End: 2024-11-22

## 2024-11-22 RX ADMIN — ONDANSETRON 4 MG: 2 INJECTION INTRAMUSCULAR; INTRAVENOUS at 16:55:00

## 2024-11-22 RX ADMIN — SODIUM CHLORIDE, SODIUM LACTATE, POTASSIUM CHLORIDE, CALCIUM CHLORIDE: 600; 310; 30; 20 INJECTION, SOLUTION INTRAVENOUS at 15:54:00

## 2024-11-22 RX ADMIN — LIDOCAINE HYDROCHLORIDE 25 MG: 10 INJECTION, SOLUTION EPIDURAL; INFILTRATION; INTRACAUDAL; PERINEURAL at 15:58:00

## 2024-11-22 RX ADMIN — MIDAZOLAM HYDROCHLORIDE 1 MG: 1 INJECTION INTRAMUSCULAR; INTRAVENOUS at 15:52:00

## 2024-11-22 RX ADMIN — MIDAZOLAM HYDROCHLORIDE 1 MG: 1 INJECTION INTRAMUSCULAR; INTRAVENOUS at 15:59:00

## 2024-11-22 RX ADMIN — KETOROLAC TROMETHAMINE 15 MG: 30 INJECTION, SOLUTION INTRAMUSCULAR; INTRAVENOUS at 17:12:00

## 2024-11-22 NOTE — ANESTHESIA PREPROCEDURE EVALUATION
Anesthesia PreOp Note    HPI:     Praveena Olivia is a 64 year old female who presents for preoperative consultation requested by: Moris Seals MD    Date of Surgery: 11/22/2024    Procedure(s):  Debridement right knee flap closure  Indication: Disruption of external surgical wound, initial encounter [T81.31XA]    Relevant Problems   No relevant active problems       NPO:  Last Liquid Consumption Date: 11/22/24  Last Liquid Consumption Time: 0800  Last Solid Consumption Date: 11/21/24  Last Solid Consumption Time: 2030  Last Liquid Consumption Date: 11/22/24          History Review:  Patient Active Problem List    Diagnosis Date Noted    RA (rheumatoid arthritis) (HCC)     Retinal detachment, left     Panhypopituitarism (HCC)     Hypothyroid     Osteopenia     Rib fracture     Peritonitis (HCC) 11/12/2015    Leukocytosis, unspecified elevated WBC count 11/12/2015       Past Medical History:    Back problem    Bladder fistula    Cardiac murmur    Cataract    Cataracts, bilateral    Chronic liver disease    Depressive disorder    Disorder of thyroid    Diverticulitis    Essential hypertension    History of compression fracture of vertebral column    Hypothyroid    Osteoarthritis    Osteopenia    Panhypopituitarism (HCC)    RA (rheumatoid arthritis) (HCC)    Retinal detachment, left    Rib fracture    Visual impairment       Past Surgical History:   Procedure Laterality Date    Back surgery      Bunionectomy      Hernia repair      Other surgical history      recto-vaginal repair     Other surgical history      Wires in the jaw after a fall 2 yrs ago    Other surgical history      R 3rd toe screw     Other surgical history      Cataract bilateral    Rotator cuff repair      Total knee replacement Bilateral 08/20/2024    L TKA  1/2024 ; RTKA 8/2024       Prescriptions Prior to Admission[1]  Current Medications and Prescriptions Ordered in Epic[2]    Allergies[3]    Family History   Problem Relation Age of Onset     Diabetes Father     Heart Disorder Father     Heart Disorder Mother      Social History     Socioeconomic History    Marital status:    Tobacco Use    Smoking status: Never   Substance and Sexual Activity    Alcohol use: Not Currently     Comment: occasional    Drug use: No   Other Topics Concern    Right Handed Yes       Available pre-op labs reviewed.             Vital Signs:  Body mass index is 17.75 kg/m².   height is 1.524 m (5') and weight is 41.2 kg (90 lb 14.4 oz). Her oral temperature is 97.9 °F (36.6 °C). Her blood pressure is 127/74 and her pulse is 82. Her respiration is 16 and oxygen saturation is 100%.   Vitals:    11/21/24 1210 11/22/24 1253   BP:  127/74   Pulse:  82   Resp:  16   Temp:  97.9 °F (36.6 °C)   TempSrc:  Oral   SpO2:  100%   Weight: 46.3 kg (102 lb) 41.2 kg (90 lb 14.4 oz)   Height: 1.524 m (5') 1.524 m (5')        Anesthesia Evaluation     Patient summary reviewed    Airway   Mallampati: III  TM distance: >3 FB  Neck ROM: full  Comment: Small mouth opening  Dental      Pulmonary - negative ROS and normal exam   Cardiovascular - negative ROS and normal exam  Exercise tolerance: good  (+) hypertension  (-) CAD    ECG reviewed    Neuro/Psych - negative ROS     GI/Hepatic/Renal - negative ROS     Endo/Other - negative ROS   (+) hypothyroidism, arthritis  Abdominal   (-) obese                 Anesthesia Plan:   ASA:  2  Plan:   General  Airway:  LMA  Informed Consent Plan and Risks Discussed With:  Patient  Discussed plan with:  CRNA      I have informed Praveena Olivia and/or legal guardian or family member of the nature of the anesthetic plan, benefits, risks including possible dental damage if relevant, major complications, and any alternative forms of anesthetic management.   All of the patient's questions were answered to the best of my ability. The patient desires the anesthetic management as planned.  Andrei Melgoza DO  11/22/2024 2:06 PM  Present on Admission:  **None**            [1]   Medications Prior to Admission   Medication Sig Dispense Refill Last Dose/Taking    Cholecalciferol (VITAMIN D) 50 MCG (2000 UT) Oral Cap Take 1 tablet by mouth daily.   11/21/2024 Morning    gabapentin 600 MG Oral Tab Take 1 tablet (600 mg total) by mouth 2 (two) times daily.   11/22/2024 Morning    fluticasone propionate 50 MCG/ACT Nasal Suspension USE 2 SPRAY IN EACH NOSTRIL ONCE DAILY SHAKE WELL BEFORE USE, PRIME PUMP AND CLEAN TIP WHEN DONE   11/21/2024 Bedtime    aspirin 81 MG Oral Tab EC Take 1 tablet (81 mg total) by mouth daily.   11/21/2024 Morning    Diclofenac-miSOPROStol 75-0.2 MG Oral Tab EC Take 1 tablet by mouth daily.   Taking    HYDROcodone-acetaminophen 5-325 MG Oral Tab Take 1 tablet by mouth every 6 (six) hours as needed for Pain.   11/22/2024 Morning    leflunomide 20 MG Oral Tab Take 1 tablet (20 mg total) by mouth daily.   11/22/2024 Morning    levothyroxine 88 MCG Oral Tab    11/22/2024 Morning    predniSONE 5 MG Oral Tab Take 1 tablet (5 mg total) by mouth daily.   11/22/2024 Morning    HYDROcodone-acetaminophen 7.5-325 MG Oral Tab Take 0.5-1 tablets by mouth every 4 to 6 hours as needed for Pain. For postoperative pain only.  Do not take with Norco 5 mg. 10 tablet 0 Unknown    Meloxicam 15 MG Oral Tab  (Patient not taking: Reported on 11/21/2024)       denosumab (PROLIA) 60 MG/ML Subcutaneous Solution Inject 1 mL (60 mg total) into the skin every 6 (six) months. 1 mL 0 Unknown   [2]   Current Facility-Administered Medications Ordered in Epic   Medication Dose Route Frequency Provider Last Rate Last Admin    lactated ringers infusion   Intravenous Continuous Moris Seals MD 20 mL/hr at 11/22/24 1311 New Bag at 11/22/24 1311    ceFAZolin (Ancef) 2g in 10mL IV syringe premix  2 g Intravenous Once Moris Seals MD         No current Psychiatric-ordered outpatient medications on file.   [3]   Allergies  Allergen Reactions    Sulfasalazine OTHER (SEE COMMENTS)     Jaundice  and liver issues    Adalimumab RASH     Newly developed psoriasis    Penicillins RASH     No skin peeling/ blisters; no organ onvolvement

## 2024-11-22 NOTE — DISCHARGE INSTRUCTIONS
Moris Ruiz M.D.   [phone]  Plastic and Reconstructive Surgery, Hand Surgery  [address], [address]  [place] [address]     GENERAL INSTRUCTIONS:  Do not remove dressing for any reason.  Keep dressing clean and dry.  Some drainage (blood and fluid) through the dressing is expected.  Some swelling is normal.  Take medications as directed.  Do not drive.       YOU HAVE AN APPOINTMENT AT THE OFFICE ON   11/29/24           HOME INSTRUCTIONS  [facility] HOME CARE INSTRUCTIONS: POST-OP ANESTHESIA  The medication that you received for sedation or general anesthesia can last up to 24 hours. Your judgment and reflexes may be altered, even if you feel like your normal self.      We Recommend:   Do not drive any motor vehicle or bicycle   Avoid mowing the lawn, playing sports, or working with power tools/applicances (power saws, electric knives or mixers)   That you have someone stay with you on your first night home   Do not drink alcohol or take sleeping pills or tranquilizers   Do not sign legal documents within 24 hours of your procedure   If you had a nerve block for your surgery, take extra care not to put any pressure on your arm or hand for 24 hours    It is normal:  For you to have a sore throat if you had a breathing tube during surgery (while you were asleep!). The sore throat should get better within 48 hours. You can gargle with warm salt water (1/2 tsp in 4 oz warm water) or use a throat lozenge for comfort  To feel muscle aches or soreness especially in the abdomen, chest or neck. The achy feeling should go away in the next 24 hours  To feel weak, sleepy or \"wiped out\". Your should start feeling better in the next 24 hours.   To experience mild discomforts such as sore lip or tongue, headache, cramps, gas pains or a bloated feeling in your abdomen.   To experience mild back pain or soreness for a day or two if you had spinal or epidural anesthesia.   If you had laparoscopic  surgery, to feel shoulder pain or discomfort on the day of surgery.   For some patients to have nausea after surgery/anesthesia    If you feel nausea or experience vomiting:   Try to move around less.   Eat less than usual or drink only liquids until the next morning   Nausea should resolve in about 24 hours    If you have a problem when you are at home:    Call your surgeons office       Discharge Instructions: After Your Surgery  You’ve just had surgery. During surgery, you were given medicine called anesthesia to keep you relaxed and free of pain. After surgery, you may have some pain or nausea. This is common. Here are some tips for feeling better and getting well after surgery.   Going home  Your healthcare provider will show you how to take care of yourself when you go home. They'll also answer your questions. Have an adult family member or friend drive you home. For the first 24 hours after your surgery:   Don't drive or use heavy equipment.  Don't make important decisions or sign legal papers.  Take medicines as directed.  Don't drink alcohol.  Have someone stay with you, if needed. They can watch for problems and help keep you safe.  Be sure to go to all follow-up visits with your healthcare provider. And rest after your surgery for as long as your provider tells you to.   Coping with pain  If you have pain after surgery, pain medicine will help you feel better. Take it as directed, before pain becomes severe. Also, ask your healthcare provider or pharmacist about other ways to control pain. This might be with heat, ice, or relaxation. And follow any other instructions your surgeon or nurse gives you.      Stay on schedule with your medicine.     Tips for taking pain medicine  To get the best relief possible, remember these points:   Pain medicines can upset your stomach. Taking them with a little food may help.  Most pain relievers taken by mouth need at least 20 to 30 minutes to start to work.  Don't wait  till your pain becomes severe before you take your medicine. Try to time your medicine so that you can take it before starting an activity. This might be before you get dressed, go for a walk, or sit down for dinner.  Constipation is a common side effect of some pain medicines. Call your healthcare provider before taking any medicines such as laxatives or stool softeners to help ease constipation. Also ask if you should skip any foods. Drinking lots of fluids and eating foods such as fruits and vegetables that are high in fiber can also help. Remember, don't take laxatives unless your surgeon has prescribed them.  Drinking alcohol and taking pain medicine can cause dizziness and slow your breathing. It can even be deadly. Don't drink alcohol while taking pain medicine.  Pain medicine can make you react more slowly to things. Don't drive or run machinery while taking pain medicine.  Your healthcare provider may tell you to take acetaminophen to help ease your pain. Ask them how much you're supposed to take each day. Acetaminophen or other pain relievers may interact with your prescription medicines or other over-the-counter (OTC) medicines. Some prescription medicines have acetaminophen and other ingredients in them. Using both prescription and OTC acetaminophen for pain can cause you to accidentally overdose. Read the labels on your OTC medicines with care. This will help you to clearly know the list of ingredients, how much to take, and any warnings. It may also help you not take too much acetaminophen. If you have questions or don't understand the information, ask your pharmacist or healthcare provider to explain it to you before you take the OTC medicine.   Managing nausea  Some people have an upset stomach (nausea) after surgery. This is often because of anesthesia, pain, or pain medicine, less movement of food in the stomach, or the stress of surgery. These tips will help you handle nausea and eat healthy foods  as you get better. If you were on a special food plan before surgery, ask your healthcare provider if you should follow it while you get better. Check with your provider on how your eating should progress. It may depend on the surgery you had. These general tips may help:   Don't push yourself to eat. Your body will tell you when to eat and how much.  Start off with clear liquids and soup. They're easier to digest.  Next try semi-solid foods as you feel ready. These include mashed potatoes, applesauce, and gelatin.  Slowly move to solid foods. Don’t eat fatty, rich, or spicy foods at first.  Don't force yourself to have 3 large meals a day. Instead eat smaller amounts more often.  Take pain medicines with a small amount of solid food, such as crackers or toast. This helps prevent nausea.  When to call your healthcare provider  Call your healthcare provider right away if you have any of these:   You still have too much pain, or the pain gets worse, after taking the medicine. The medicine may not be strong enough. Or there may be a complication from the surgery.  You feel too sleepy, dizzy, or groggy. The medicine may be too strong.  Side effects such as nausea or vomiting. Your healthcare provider may advise taking other medicines to .  Skin changes such as rash, itching, or hives. This may mean you have an allergic reaction. Your provider may advise taking other medicines.  The incision looks different (for instance, part of it opens up).  Bleeding or fluid leaking from the incision site, and weren't told to expect that.  Fever of 100.4°F (38°C) or higher, or as directed by your provider.  Call 911  Call 911 right away if you have:   Trouble breathing  Facial swelling    If you have obstructive sleep apnea   You were given anesthesia medicine during surgery to keep you comfortable and free of pain. After surgery, you may have more apnea spells because of this medicine and other medicines you were given. The spells  may last longer than normal.    At home:  Keep using the continuous positive airway pressure (CPAP) device when you sleep. Unless your healthcare provider tells you not to, use it when you sleep, day or night. CPAP is a common device used to treat obstructive sleep apnea.  Talk with your provider before taking any pain medicine, muscle relaxants, or sedatives. Your provider will tell you about the possible dangers of taking these medicines.  Contact your provider if your sleeping changes a lot even when taking medicines as directed.  [employer] last reviewed this educational content on 10/1/2021  © 2297-1354 [employer]. All rights reserved. This information is not intended as a substitute for professional medical care. Always follow your healthcare professional's instructions.

## 2024-11-22 NOTE — BRIEF OP NOTE
Pre-Operative Diagnosis: Disruption of external surgical wound, initial encounter [T81.31XA]     Post-Operative Diagnosis: Disruption of external surgical wound, initial encounter [T81.31XA]      Procedure Performed:   Debridement right knee flap closure    Surgeons and Role:     * Moris Seals MD - Primary    Assistant(s):  Surgical Assistant.: Berta Lennon     Surgical Findings: Wound dehiscence     Specimen: Debridement     Estimated Blood Loss: 25 ml    Dictation Number:      Moris Ruiz MD  11/22/2024  5:22 PM

## 2024-11-22 NOTE — INTERVAL H&P NOTE
Pre-op Diagnosis: Disruption of external surgical wound, initial encounter [T81.31XA]    The above referenced H&P was reviewed by Moris Ruiz MD on 11/22/2024, the patient was examined and no significant changes have occurred in the patient's condition since the H&P was performed.  I discussed with the patient and/or legal representative the potential benefits, risks and side effects of this procedure; the likelihood of the patient achieving goals; and potential problems that might occur during recuperation.  I discussed reasonable alternatives to the procedure, including risks, benefits and side effects related to the alternatives and risks related to not receiving this procedure.  We will proceed with procedure as planned.

## 2024-11-22 NOTE — ANESTHESIA PROCEDURE NOTES
Airway  Date/Time: 11/22/2024 4:06 PM  Urgency: elective      General Information and Staff    Patient location during procedure: OR  Anesthesiologist: Placido May MD  Resident/CRNA: Chikis Walker CRNA  Performed: CRNA   Performed by: Chikis Wlaker CRNA  Authorized by: Placido May MD      Indications and Patient Condition  Indications for airway management: airway protection and anesthesia  Spontaneous ventilation: present  Sedation level: deep  Preoxygenated: yes  Patient position: sniffing  Mask difficulty assessment: 0 - not attempted    Final Airway Details  Final airway type: supraglottic airway      Successful airway: Size 3       Number of attempts at approach: 1  Number of other approaches attempted: 0

## 2024-11-23 ENCOUNTER — TELEPHONE (OUTPATIENT)
Dept: SURGERY | Facility: CLINIC | Age: 64
End: 2024-11-23

## 2024-11-23 NOTE — OPERATIVE REPORT
[facility]    PATIENT'S NAME: TANGELA LOPEZ   ATTENDING PHYSICIAN: Moris Ruiz MD   OPERATING PHYSICIAN: Moris Ruiz MD   PATIENT ACCOUNT#:   [id number]    LOCATION:  [facility] 3 [facility] 10  MEDICAL RECORD #:   [id number]       YOB: 1960  ADMISSION DATE:       11/22/2024      OPERATION DATE:  11/22/2024    OPERATIVE REPORT      PREOPERATIVE DIAGNOSIS:  Right knee nonhealing wound with eschar.  POSTOPERATIVE DIAGNOSIS:  Right knee nonhealing wound with eschar.  PROCEDURE:  Right knee wound excision with excision of eschar and closure of dehiscence.    INDICATIONS:  A 64-year-old female who underwent a total knee arthroplasty August 20.  She has done well postoperatively from the orthopedic standpoint, but has persistent crusting and eschar which has not  with local care.  It is now 3 months postoperatively, and she is admitted to the operating amphitheater for eschar excision and closure of wound dehiscence.    FINDINGS:  A 13 x 1 cm full-thickness eschar of the central incision is present without infection.  Patient has a BMI of 17.75, and skin flaps and tissues are extraordinarily thin in this area.  At surgery was found an intact arthrotomy and extensive full-thickness eschar necrosis.  There is no infection.    OPERATIVE TECHNIQUE:  Patient was placed under general anesthesia.  Extremity was prepped and draped in usual sterile fashion.     We performed a fusiform excision of the eschar full-thickness to the level of the underlying fascia.  There was no gross infection.  The arthrotomy was intact.  Meticulous hemostasis was achieved.    The wound was copiously irrigated with saline.    To effect closure, we undermined medially and laterally a short distance to not compromise the vasculature of this very thin flaps.      The flaps were then advanced.  The deep dermal sutures of 2-0 and 3-0 Vicryl were placed.  Skin edges were reapproximated with staples.  A  soft dressing was placed incorporating a knee immobilizer.    The patient tolerated the procedure well and left the operating suite in satisfactory condition.      Dictated By Moris Ruiz MD  d: 11/22/2024 17:25:18  t: 11/23/2024 02:55:56  [facility] [id number]  RVJ/    cc: MD Aaron Rodriguez DO

## 2024-11-23 NOTE — TELEPHONE ENCOUNTER
Left message for post-operative patient to please call the office with any questions and/or concerns. Reminded patient of next RN appointment on 11/29 and MD appointment on 12/12.   Dr. Ruiz notified.

## 2024-11-25 ENCOUNTER — TELEPHONE (OUTPATIENT)
Dept: SURGERY | Facility: CLINIC | Age: 64
End: 2024-11-25

## 2024-11-25 DIAGNOSIS — T81.31XA DISRUPTION OF EXTERNAL SURGICAL WOUND, INITIAL ENCOUNTER: Primary | ICD-10-CM

## 2024-11-25 RX ORDER — HYDROCODONE BITARTRATE AND ACETAMINOPHEN 7.5; 325 MG/1; MG/1
TABLET ORAL
Qty: 15 TABLET | Refills: 0 | Status: SHIPPED | OUTPATIENT
Start: 2024-11-25

## 2024-11-25 NOTE — TELEPHONE ENCOUNTER
Spoke with pt.  11/22/24 pt had R knee closure of wound dehiscence.  C/o constant stinging R knee.  Rates pain 6/10.  Currently taking Norco 5 mg po Q 4 hours prn,ineffective.  Initially was taking Norco 7.5 mg po Q 4 hours prn which was effective for pain control, but she completed prescription.  States is doing minimal walking and elevating R knee during the day but at night it's painful to lay on back and has been lying on her side.  Instructed pt to try putting pillows between her legs while laying on her side sleeping to keep R leg elevated, pt states that will be difficult but she'll try.  Dressing and knee immobilizer CDI.  Pt requesting refill of Norco 7.5 mg po Q 4 hours prn.  Pt told I would update Dr Ruiz and call her back.  Verbalized understanding.  Dr Mayo notified.

## 2024-11-25 NOTE — TELEPHONE ENCOUNTER
Spoke with pt.  Pt told her Norco 7.5 mg po had been reordered to her preferred pharmacy.  Please let us know if you have any other questions and/or concerns.  Verbalized understanding.

## 2024-11-25 NOTE — PROGRESS NOTES
3 days postop  Still having pain at night, unrelieved by Norco 5.0    Refilled Norco 7.5  Do not take with [place] 5.0    [facility] [place] Data Reviewed.

## 2024-11-29 ENCOUNTER — NURSE ONLY (OUTPATIENT)
Dept: SURGERY | Facility: CLINIC | Age: 64
End: 2024-11-29
Payer: COMMERCIAL

## 2024-11-29 DIAGNOSIS — T81.31XA DISRUPTION OF EXTERNAL SURGICAL WOUND, INITIAL ENCOUNTER: ICD-10-CM

## 2024-11-29 DIAGNOSIS — Z48.01 CHANGE OR REMOVAL OF SURGICAL WOUND DRESSING: Primary | ICD-10-CM

## 2024-11-29 NOTE — PROGRESS NOTES
Surgery 1: EHMKE: RTKA  - Date: 08/20/24  - Days Since: 101    Surgery 2: R knee colusure of wound dehiscence  - Date: 11/22/24  - Days Since: 7    Pt here for dressing change to right leg  Pt identified w/2 identifiers and orders verified.  Pt presents w/ right leg in knee immobilizer w/surgical dressings.  Pt assisted to exam chair and exam chair reclined for dressing change.  Pt denies s/s infection.  Pt complains of constant dull pain 4/10 to right knee taking norco 7.5mg as needed that is effective.  Right pedal pulse positive.  Knee immobilizer and surgical dressing to right leg removed carefully.  Dressings are dry, xeroform to staples with minimal dried blood.  [place] C/D/I and incision appears to be healing well; edges well approximated.  mild edema to medial right knee noted  Site cleansed w/sterile NS, new xeroform, 4x4 gauze, kerlix, and ace wrap applied; pedal pulse positive and pt states that dressing fits comfortably.  Knee immobilizer applied and pt states that it fits well.  Pt reminded of f/u appt on 12/6 w/RN   Pt instructed to call the office w/any questions and/or concerns or if dressing needs to be readjusted.  Pt verbalized an understanding.  Dr. Ruiz notified.     Dressing change right knee closure of wound dehiscence   Next appt with RN 12/6  MD 12/12  Next

## 2024-12-02 ENCOUNTER — TELEPHONE (OUTPATIENT)
Dept: SURGERY | Facility: CLINIC | Age: 64
End: 2024-12-02

## 2024-12-02 DIAGNOSIS — T81.31XA DISRUPTION OF EXTERNAL SURGICAL WOUND, INITIAL ENCOUNTER: ICD-10-CM

## 2024-12-02 RX ORDER — HYDROCODONE BITARTRATE AND ACETAMINOPHEN 7.5; 325 MG/1; MG/1
TABLET ORAL
Qty: 15 TABLET | Refills: 0 | Status: SHIPPED | OUTPATIENT
Start: 2024-12-02

## 2024-12-02 NOTE — TELEPHONE ENCOUNTER
Spoke w/ pt.  Pt informed Dr Ruiz given pt update on R knee pain and sent refill Norco 7.5 mg to preferred pharmacy today.  Pt instructed can loosen straps slightly on knee immobilizer for comfort.  Pt instructed to call the office w/ any further question or concerns.  Pt verbalized understanding.  Dr Ruiz notified.

## 2024-12-02 NOTE — TELEPHONE ENCOUNTER
Spoke w/ pt.  Pt is POD 10, R knee closure.  Pt requesting refill Norco 7.5 mg, last rx 11/25, #15, no refills.  Pt reports intermittent \"stinging\" pain to incision.   Pt is keep R knee elevated at all times.  Pt reports difficulty w/ R knee immobilizer, causes pain w/ any movement.   Next RN for SR on 12/6.  Pt informed would update Dr Ruiz and call her back.  Pt verbalized understanding.  Dr Ruiz notified.

## 2024-12-02 NOTE — TELEPHONE ENCOUNTER
Spoke with pt.  Pt told Dr Ruiz renewed her [place] prescription at her preferred pharmacy.  Instructed to call the office with any other questions and/or concerns.  Verbalized understanding.

## 2024-12-06 ENCOUNTER — TELEPHONE (OUTPATIENT)
Dept: SURGERY | Facility: CLINIC | Age: 64
End: 2024-12-06

## 2024-12-06 ENCOUNTER — NURSE ONLY (OUTPATIENT)
Dept: SURGERY | Facility: CLINIC | Age: 64
End: 2024-12-06
Payer: COMMERCIAL

## 2024-12-06 DIAGNOSIS — T81.31XA DISRUPTION OF EXTERNAL SURGICAL WOUND, INITIAL ENCOUNTER: ICD-10-CM

## 2024-12-06 DIAGNOSIS — Z48.02 ENCOUNTER FOR STAPLE REMOVAL: Primary | ICD-10-CM

## 2024-12-06 DIAGNOSIS — T81.31XA DISRUPTION OF EXTERNAL SURGICAL WOUND, INITIAL ENCOUNTER: Primary | ICD-10-CM

## 2024-12-06 RX ORDER — HYDROCODONE BITARTRATE AND ACETAMINOPHEN 5; 325 MG/1; MG/1
1 TABLET ORAL EVERY 8 HOURS PRN
Qty: 8 TABLET | Refills: 0 | Status: SHIPPED | OUTPATIENT
Start: 2024-12-06

## 2024-12-06 NOTE — TELEPHONE ENCOUNTER
Spoke w/ pt.  Pt informed Dr Ruiz will prescribe a few more tablets of [place] to preferred pharmacy. Confirm w/ pharmacy rx is ready.  Pt is also taking tylenol as needed, pt reminded to follow manufacturers daily dosing instructions for tylenol as Norco also contains acetominophen. Do not exceed maximum daily dose of acetominophen.   Reminded pt per Dr Ruiz to keep R leg elevated above waist at all times.  Pt instructed to loosen bottom strap of knee immobilizer, as it is too tight.   Pt verbalized understanding.  Dr Ruiz notified.

## 2024-12-06 NOTE — PROGRESS NOTES
Surgery 1: EHMKE: RTKA  - Date: 08/20/24  - Days Since: 108    Surgery 2: R knee colusure of wound dehiscence  - Date: 11/22/24  - Days Since: 14    Pt here for staple removal to R knee.  Pt identified w/ two identifiers and orders verified.  Pt remained in wheelchair for safety during visit.  Pt complains of constant stinging pain near incision and due to discomfort wearing knee immobilizer.  Pt rates incisional pain 8/10, is waking her at night. Pt denies R calf tenderness, pain.  Pt taking Norco for pain as needed, is helpful.  Last tablet taken this morning.  Pt presents w/ R leg in knee immobilizer w/ dressings C/D/I, removed carefully.  Pt R lower leg and foot edematous, R pedal pulse not palpable, there is capillary refill to R great toe. Pt's R lower leg/foot is cool to touch.  Pt is elevating R leg on foot stool but not at height greater than her waist.  R knee staples C/D/I and incision appears to be healing well, edges well approximated. No s/s infection.   Staples removed without difficulty and pt tolerated procedure well.  Site cleansed w/ sterile water and gauze, new ABD pad, kerlix, ACE wrap, and R knee immobilizer applied.  Pt states that dressing and R knee immobilizer fit comfortably.  Pt and caregiver reminded importance of keeping R leg elevated at all times to decrease swelling.  Pt reminded next MD appt 12/12.  Pt reminded to call office w/ any further questions or concerns.  Pt verbalized an understanding.  Dr Ruiz notified.     SR R knee closure of wound dehiscence  Continues to have moderate incisional pain related to discomfort of knee immobilizer.  ABD, kerlix, ACE wrap, knee immobilizer.  Next MD 12/12

## 2024-12-12 ENCOUNTER — OFFICE VISIT (OUTPATIENT)
Dept: SURGERY | Facility: CLINIC | Age: 64
End: 2024-12-12
Payer: COMMERCIAL

## 2024-12-12 DIAGNOSIS — T81.31XA DISRUPTION OF EXTERNAL SURGICAL WOUND, INITIAL ENCOUNTER: Primary | ICD-10-CM

## 2024-12-12 PROCEDURE — 99024 POSTOP FOLLOW-UP VISIT: CPT | Performed by: PLASTIC SURGERY

## 2024-12-12 NOTE — PROGRESS NOTES
Surgery 1: EHMKE: RTKA  - Date: 08/20/24  - Days Since: 114    Surgery 2: R knee colusure of wound dehiscence  - Date: 11/22/24  - Days Since: 20    Pt here for post op visit R knee wound dehiscence.  Pt has moderate pain rates 5/10, is taking Norco as needed.   Pt verbalizes her discomfort is from wearing R knee immobilizer.  Pt is elevating R knee as much as possible, jeffry feet edematous.  Pt R arrived wearing R knee immobilizer and dressings C/D/I, removed.  Small amount dried blood on ABD pad.  Small incisional wound dehiscence noted over R knee.    20 days postop  No complaints.  No pain.    Incision looks excellent  1 mm central dehiscence  No drainage or infection    Discontinue knee immobilizer  Daily shower, wash with soap and water  Polysporin Band-Aid    1 month

## 2024-12-12 NOTE — PROGRESS NOTES
Pt and pt's daughter instructed per Dr Shine can discontinue R knee immobilizer.  Pt instructed to shower and wash R knee daily, apply Polysporin, bandaid.  Pt should hold PT for two weeks.  Pt scheduled for 1 month FU visit for 1/9 at 4:00 PM.  Pt given written and verbal demonstration of wound care instructions.  Pt instructed to call office w/ any further questions or concerns.  Pt verbalized understanding.

## (undated) DEVICE — ELECTRODE ESURG NDL STD 2.75IN .75IN 132IN WECK STRL DISP

## (undated) DEVICE — ELECTRODE ESURG BLADE PNCL 10FT BTN SWH CORD HLSTR EDGE PVC

## (undated) DEVICE — GLOVE SUR 7 SENSICARE PI MIC PIP CRM PWD F

## (undated) DEVICE — Device

## (undated) DEVICE — HANDLE SCT FRZR 10FR CNTRL VENT STRL LF DISP

## (undated) DEVICE — YANKAUER,BULB TIP,W/O VENT,RIGID,STERILE: Brand: MEDLINE

## (undated) DEVICE — MINOR GENERAL: Brand: MEDLINE INDUSTRIES, INC.

## (undated) DEVICE — SUTURE VICRYL 3-0 SH 27IN BRAID COAT ABS VIOL J316H

## (undated) DEVICE — SOLUTION IRRIG 1000ML 0.9% NACL USP BTL

## (undated) DEVICE — PROXIMATE RH ROTATING HEAD SKIN STAPLERS (35 WIDE) CONTAINS 35 STAINLESS STEEL STAPLES: Brand: PROXIMATE

## (undated) DEVICE — SPLINT NSL SPT SM 65X35MM VELCRO ALUM 2 PC KIT XTRN DRSL PAD

## (undated) DEVICE — KERLIX BANDAGE ROLL: Brand: KERLIX

## (undated) DEVICE — SUTURE 3-0 SH 27IN CR MONO ABS BRN G122H

## (undated) DEVICE — DONUT PSTN 7IN HEAD FOAM

## (undated) DEVICE — DRAPE BTN WALTERLORENZ EQUIPMENT

## (undated) DEVICE — TRAY CATH SURESTEP LBRCTH STLK 16FR URMTR STAB DEV FOLEY

## (undated) DEVICE — OXIMETER PULSE MASIMOSET LNCS SPO2 ADH NS LF DISP

## (undated) DEVICE — IMMOBILIZER KNEE AD L16IN UNIV FOAM LAM COMPR

## (undated) DEVICE — SYRINGE BULB 50/CS 48/PLT: Brand: MEDEGEN MEDICAL PRODUCTS, LLC

## (undated) DEVICE — DRAPE ANTIMICROBIAL INCS 13X13IN SURG IOBN2 STRL

## (undated) DEVICE — SKIN PREP TRAY 4 COMPARTM TRAY: Brand: MEDLINE INDUSTRIES, INC.

## (undated) DEVICE — POSITIONER PSTN 9IN DONUT HEAD FOAM

## (undated) DEVICE — ANTIBACTERIAL UNDYED BRAIDED (POLYGLACTIN 910), SYNTHETIC ABSORBABLE SUTURE: Brand: COATED VICRYL

## (undated) DEVICE — UNDYED BRAIDED (POLYGLACTIN 910), SYNTHETIC ABSORBABLE SUTURE: Brand: COATED VICRYL

## (undated) DEVICE — BANDAGE,GAUZE,BULKEE II,4.5"X4.1YD,STRL: Brand: MEDLINE

## (undated) DEVICE — STERILE TETRA-FLEX CF, ELASTIC BANDAGE, 4" X 5.5YD: Brand: TETRA-FLEX™CF

## (undated) DEVICE — GOWN,SIRUS,FAB REINF,RAGLAN,L,STERILE: Brand: MEDLINE

## (undated) DEVICE — SPONGE LAP 18X18IN WHT COT 4 PLY FLD STRUNG

## (undated) DEVICE — GOWN SURG 3XL XLONG L4 RAGLAN SLV BRTHBL STRL LF DISP

## (undated) DEVICE — DRAPE,EXTREMITY,89X128,STERILE: Brand: MEDLINE